# Patient Record
Sex: MALE | URBAN - METROPOLITAN AREA
[De-identification: names, ages, dates, MRNs, and addresses within clinical notes are randomized per-mention and may not be internally consistent; named-entity substitution may affect disease eponyms.]

---

## 2023-05-16 ENCOUNTER — APPOINTMENT (RX ONLY)
Dept: URBAN - METROPOLITAN AREA CLINIC 101 | Facility: CLINIC | Age: 63
Setting detail: DERMATOLOGY
End: 2023-05-16

## 2023-05-16 DIAGNOSIS — L81.4 OTHER MELANIN HYPERPIGMENTATION: ICD-10-CM

## 2023-05-16 DIAGNOSIS — L82.1 OTHER SEBORRHEIC KERATOSIS: ICD-10-CM

## 2023-05-16 DIAGNOSIS — B35.1 TINEA UNGUIUM: ICD-10-CM

## 2023-05-16 DIAGNOSIS — Z71.89 OTHER SPECIFIED COUNSELING: ICD-10-CM

## 2023-05-16 DIAGNOSIS — D18.0 HEMANGIOMA: ICD-10-CM

## 2023-05-16 DIAGNOSIS — D485 NEOPLASM OF UNCERTAIN BEHAVIOR OF SKIN: ICD-10-CM

## 2023-05-16 DIAGNOSIS — D22 MELANOCYTIC NEVI: ICD-10-CM

## 2023-05-16 DIAGNOSIS — Z12.83 ENCOUNTER FOR SCREENING FOR MALIGNANT NEOPLASM OF SKIN: ICD-10-CM

## 2023-05-16 DIAGNOSIS — L82.0 INFLAMED SEBORRHEIC KERATOSIS: ICD-10-CM

## 2023-05-16 PROBLEM — D48.5 NEOPLASM OF UNCERTAIN BEHAVIOR OF SKIN: Status: ACTIVE | Noted: 2023-05-16

## 2023-05-16 PROBLEM — D18.01 HEMANGIOMA OF SKIN AND SUBCUTANEOUS TISSUE: Status: ACTIVE | Noted: 2023-05-16

## 2023-05-16 PROBLEM — D22.62 MELANOCYTIC NEVI OF LEFT UPPER LIMB, INCLUDING SHOULDER: Status: ACTIVE | Noted: 2023-05-16

## 2023-05-16 PROCEDURE — ? DEFER

## 2023-05-16 PROCEDURE — ? PRESCRIPTION

## 2023-05-16 PROCEDURE — 99203 OFFICE O/P NEW LOW 30 MIN: CPT

## 2023-05-16 PROCEDURE — ? SUNSCREEN RECOMMENDATIONS

## 2023-05-16 PROCEDURE — ? COUNSELING

## 2023-05-16 PROCEDURE — ? LOCATION MARKER (SIMPLE)

## 2023-05-16 PROCEDURE — ? TREATMENT REGIMEN

## 2023-05-16 ASSESSMENT — LOCATION SIMPLE DESCRIPTION DERM
LOCATION SIMPLE: RIGHT GREAT TOE
LOCATION SIMPLE: RIGHT ELBOW
LOCATION SIMPLE: RIGHT FOREARM
LOCATION SIMPLE: RIGHT CALF
LOCATION SIMPLE: LEFT UPPER BACK
LOCATION SIMPLE: LEFT THIGH
LOCATION SIMPLE: LEFT POSTERIOR THIGH
LOCATION SIMPLE: LEFT FOREARM
LOCATION SIMPLE: RIGHT POSTERIOR UPPER ARM
LOCATION SIMPLE: RIGHT SHOULDER
LOCATION SIMPLE: LEFT CHEEK
LOCATION SIMPLE: RIGHT CHEEK
LOCATION SIMPLE: LEFT UPPER ARM
LOCATION SIMPLE: LEFT POSTERIOR UPPER ARM

## 2023-05-16 ASSESSMENT — LOCATION DETAILED DESCRIPTION DERM
LOCATION DETAILED: LEFT DISTAL DORSAL FOREARM
LOCATION DETAILED: LEFT PROXIMAL DORSAL FOREARM
LOCATION DETAILED: RIGHT POSTERIOR SHOULDER
LOCATION DETAILED: LEFT DISTAL POSTERIOR THIGH
LOCATION DETAILED: LEFT INFERIOR MEDIAL MALAR CHEEK
LOCATION DETAILED: RIGHT DISTAL CALF
LOCATION DETAILED: LEFT ANTERIOR DISTAL UPPER ARM
LOCATION DETAILED: RIGHT INFERIOR CENTRAL MALAR CHEEK
LOCATION DETAILED: RIGHT ELBOW
LOCATION DETAILED: LEFT INFERIOR UPPER BACK
LOCATION DETAILED: RIGHT PROXIMAL POSTERIOR UPPER ARM
LOCATION DETAILED: LEFT ANTERIOR DISTAL THIGH
LOCATION DETAILED: RIGHT VENTRAL LATERAL DISTAL FOREARM
LOCATION DETAILED: LEFT PROXIMAL POSTERIOR UPPER ARM
LOCATION DETAILED: RIGHT DORSAL GREAT TOE
LOCATION DETAILED: RIGHT DISTAL DORSAL FOREARM

## 2023-05-16 ASSESSMENT — LOCATION ZONE DERM
LOCATION ZONE: FACE
LOCATION ZONE: TRUNK
LOCATION ZONE: ARM
LOCATION ZONE: TOE
LOCATION ZONE: LEG

## 2023-05-16 NOTE — HPI: FULL BODY SKIN EXAMINATION
How Severe Are Your Spot(S)?: moderate
What Is The Reason For Today's Visit?: Initial Full Body Skin Examination

## 2023-05-16 NOTE — PROCEDURE: DEFER
Detail Level: Detailed
Size Of Lesion In Cm (Optional): 0
Introduction Text (Please End With A Colon): The following procedure was deferred:
Instructions (Optional): A: right upper brow 0.6cm to R/O BCC vs SCC, B: right lower scapula 0.6cm pyogenic granuloma vs nub vs dn
Instructions (Optional): treating 15+ lesions to the body due to lesions being rough, irritated, and itchy
Procedure To Be Performed At Next Visit: Cryotherapy
Other Procedure: ED&C, Cautery
Introduction Text (Please End With A Colon): The following are to be performed next visit:

## 2023-05-16 NOTE — PROCEDURE: TREATMENT REGIMEN
Initiate Treatment: Ciclopirox
Detail Level: Zone
Plan: Will plan to treat with destruction for the next few visits or until lesions have resolved.
Detail Level: Detailed

## 2023-05-17 RX ORDER — CICLOPIROX 80 MG/ML
SOLUTION TOPICAL QDAY
Qty: 6.6 | Refills: 0 | Status: ERX | COMMUNITY
Start: 2023-05-17

## 2023-05-17 RX ADMIN — CICLOPIROX: 80 SOLUTION TOPICAL at 00:00

## 2023-05-24 ENCOUNTER — APPOINTMENT (RX ONLY)
Dept: URBAN - METROPOLITAN AREA CLINIC 101 | Facility: CLINIC | Age: 63
Setting detail: DERMATOLOGY
End: 2023-05-24

## 2023-05-24 DIAGNOSIS — Z71.89 OTHER SPECIFIED COUNSELING: ICD-10-CM

## 2023-05-24 DIAGNOSIS — L82.0 INFLAMED SEBORRHEIC KERATOSIS: ICD-10-CM

## 2023-05-24 DIAGNOSIS — B35.1 TINEA UNGUIUM: ICD-10-CM

## 2023-05-24 DIAGNOSIS — D485 NEOPLASM OF UNCERTAIN BEHAVIOR OF SKIN: ICD-10-CM

## 2023-05-24 DIAGNOSIS — L57.0 ACTINIC KERATOSIS: ICD-10-CM

## 2023-05-24 PROBLEM — D48.5 NEOPLASM OF UNCERTAIN BEHAVIOR OF SKIN: Status: ACTIVE | Noted: 2023-05-24

## 2023-05-24 PROCEDURE — ? LOCATION MARKER (SIMPLE)

## 2023-05-24 PROCEDURE — 99213 OFFICE O/P EST LOW 20 MIN: CPT | Mod: 25

## 2023-05-24 PROCEDURE — ? COUNSELING

## 2023-05-24 PROCEDURE — 17000 DESTRUCT PREMALG LESION: CPT | Mod: 59

## 2023-05-24 PROCEDURE — 17003 DESTRUCT PREMALG LES 2-14: CPT | Mod: 59

## 2023-05-24 PROCEDURE — 11103 TANGNTL BX SKIN EA SEP/ADDL: CPT | Mod: 59

## 2023-05-24 PROCEDURE — 11102 TANGNTL BX SKIN SINGLE LES: CPT | Mod: 59

## 2023-05-24 PROCEDURE — ? DEFER

## 2023-05-24 PROCEDURE — ? SUNSCREEN RECOMMENDATIONS

## 2023-05-24 PROCEDURE — ? TREATMENT REGIMEN

## 2023-05-24 PROCEDURE — ? BIOPSY BY SHAVE METHOD

## 2023-05-24 PROCEDURE — ? LIQUID NITROGEN

## 2023-05-24 PROCEDURE — 17110 DESTRUCTION B9 LES UP TO 14: CPT

## 2023-05-24 PROCEDURE — ? BENIGN DESTRUCTION

## 2023-05-24 ASSESSMENT — LOCATION SIMPLE DESCRIPTION DERM
LOCATION SIMPLE: RIGHT AXILLARY VAULT
LOCATION SIMPLE: LEFT UPPER BACK
LOCATION SIMPLE: RIGHT EYEBROW
LOCATION SIMPLE: LEFT AXILLARY VAULT
LOCATION SIMPLE: LEFT CHEEK
LOCATION SIMPLE: LEFT LOWER BACK
LOCATION SIMPLE: LEFT GREAT TOE
LOCATION SIMPLE: RIGHT CHEEK
LOCATION SIMPLE: RIGHT UPPER BACK
LOCATION SIMPLE: RIGHT GREAT TOE

## 2023-05-24 ASSESSMENT — LOCATION DETAILED DESCRIPTION DERM
LOCATION DETAILED: RIGHT INFERIOR UPPER BACK
LOCATION DETAILED: LEFT AXILLARY VAULT
LOCATION DETAILED: LEFT SUPERIOR MEDIAL MIDBACK
LOCATION DETAILED: RIGHT INFERIOR MEDIAL UPPER BACK
LOCATION DETAILED: RIGHT LATERAL EYEBROW
LOCATION DETAILED: RIGHT AXILLARY VAULT
LOCATION DETAILED: RIGHT MID-UPPER BACK
LOCATION DETAILED: LEFT CENTRAL MALAR CHEEK
LOCATION DETAILED: RIGHT INFERIOR CENTRAL MALAR CHEEK
LOCATION DETAILED: RIGHT DORSAL GREAT TOE
LOCATION DETAILED: LEFT MID-UPPER BACK
LOCATION DETAILED: LEFT DORSAL GREAT TOE

## 2023-05-24 ASSESSMENT — LOCATION ZONE DERM
LOCATION ZONE: TOE
LOCATION ZONE: TRUNK
LOCATION ZONE: AXILLAE
LOCATION ZONE: FACE

## 2023-05-24 NOTE — PROCEDURE: BIOPSY BY SHAVE METHOD
Body Location Override (Optional - Billing Will Still Be Based On Selected Body Map Location If Applicable): right upper brow
Detail Level: Detailed
Depth Of Biopsy: dermis
Was A Bandage Applied: Yes
Size Of Lesion In Cm: 0.6
X Size Of Lesion In Cm: 0
Biopsy Type: H and E
Biopsy Method: Dermablade
Anesthesia Type: 1% lidocaine with epinephrine
Anesthesia Volume In Cc (Will Not Render If 0): 0.5
Hemostasis: Drysol
Wound Care: Petrolatum
Dressing: bandage
Destruction After The Procedure: No
Type Of Destruction Used: Curettage
Curettage Text: The wound bed was treated with curettage after the biopsy was performed.
Cryotherapy Text: The wound bed was treated with cryotherapy after the biopsy was performed.
Electrodesiccation Text: The wound bed was treated with electrodesiccation after the biopsy was performed.
Electrodesiccation And Curettage Text: The wound bed was treated with electrodesiccation and curettage after the biopsy was performed.
Silver Nitrate Text: The wound bed was treated with silver nitrate after the biopsy was performed.
Lab: [id number] [facility]
Path Notes (To The Dermatopathologist): R/O: BCC vs SCC \\nSize: 0.6 cm
Consent: Written consent was obtained and risks were reviewed including but not limited to scarring, infection, bleeding, scabbing, incomplete removal, nerve damage and allergy to anesthesia.
Post-Care Instructions: I reviewed with the patient in detail post-care instructions. Patient is to keep the biopsy site dry overnight, and then apply bacitracin twice daily until healed. Patient may apply hydrogen peroxide soaks to remove any crusting.
Notification Instructions: Patient will be notified of biopsy results. However, patient instructed to call the office if not contacted within 2 weeks.
Billing Type: [employer]
Information: Selecting Yes will display possible errors in your note based on the variables you have selected. This validation is only offered as a suggestion for you. PLEASE NOTE THAT THE VALIDATION TEXT WILL BE REMOVED WHEN YOU FINALIZE YOUR NOTE. IF YOU WANT TO FAX A PRELIMINARY NOTE YOU WILL NEED TO TOGGLE THIS TO 'NO' IF YOU DO NOT WANT IT IN YOUR FAXED NOTE.
Body Location Override (Optional - Billing Will Still Be Based On Selected Body Map Location If Applicable): right lower scapula
Lab: [id number]
Path Notes (To The Dermatopathologist): R/O: PYOGENIC GRANULOMA VS NUB VS DN\\nSize: 0.6 cm
Billing Type: Third-Party Bill

## 2023-05-24 NOTE — PROCEDURE: TREATMENT REGIMEN
Detail Level: Detailed
Plan: Will plan to treat with destruction for the next few visits or until lesions have resolved
Plan: Will plan to treat with destruction for the next few visits or until lesions have resolved.
Continue Regimen: Ciclopirox
Detail Level: Zone

## 2023-05-24 NOTE — PROCEDURE: LIQUID NITROGEN
Render Post-Care Instructions In Note?: no
Total Number Of Aks Treated: 2
Number Of Freeze-Thaw Cycles: 1 freeze-thaw cycle
Detail Level: Zone
Post-Care Instructions: I reviewed with the patient in detail post-care instructions. Patient is to wear sunprotection, and avoid picking at any of the treated lesions. Pt may apply Vaseline to crusted or scabbing areas.
Consent: The patient's consent was obtained including but not limited to risks of crusting, scabbing, blistering, scarring, darker or lighter pigmentary change, recurrence, incomplete removal and infection.
Duration Of Freeze Thaw-Cycle (Seconds): 5

## 2023-05-24 NOTE — PROCEDURE: BENIGN DESTRUCTION
Detail Level: Zone
Render Post-Care Instructions In Note?: no
Anesthesia Volume In Cc: 1
Medical Necessity Information: It is in your best interest to select a reason for this procedure from the list below. All of these items fulfill various CMS LCD requirements except the new and changing color options.
Consent: The patient's consent was obtained including but not limited to risks of crusting, scabbing, blistering, scarring, darker or lighter pigmentary change, recurrence, incomplete removal and infection.
Post-Care Instructions: I reviewed with the patient in detail post-care instructions. Patient is to wear sunprotection, and avoid picking at any of the treated lesions. Pt may apply Vaseline to crusted or scabbing areas.
Total Number Of Lesions Treated: 12
Medical Necessity Clause: This procedure was medically necessary because the lesions that were treated were:

## 2023-05-24 NOTE — PROCEDURE: DEFER
Size Of Lesion In Cm (Optional): 0
Other Procedure: ED&C, Cautery
Instructions (Optional): treating 15+ lesions to the face, due to lesions being rough, irritated, raised, scaly itchy and suspicious for premalignancy
Procedure To Be Performed At Next Visit: Cryotherapy
Detail Level: Detailed
Introduction Text (Please End With A Colon): The following are to be performed next visit:
Instructions (Optional): treating 15+ lesions to the axilla and back, due to lesions being rough, irritated, and itchy
Procedure To Be Performed At Next Visit: Electrodesiccation
Procedure To Be Performed At Next Visit: Biopsy by shave method
Instructions (Optional): Plan to biopsy suspicious lesions at next visit if no improvement with current treatment
Introduction Text (Please End With A Colon): The following procedure was deferred: